# Patient Record
Sex: MALE | HISPANIC OR LATINO | Employment: FULL TIME | URBAN - METROPOLITAN AREA
[De-identification: names, ages, dates, MRNs, and addresses within clinical notes are randomized per-mention and may not be internally consistent; named-entity substitution may affect disease eponyms.]

---

## 2023-01-23 ENCOUNTER — APPOINTMENT (OUTPATIENT)
Dept: CT IMAGING | Facility: CLINIC | Age: 43
End: 2023-01-23
Attending: EMERGENCY MEDICINE
Payer: OTHER MISCELLANEOUS

## 2023-01-23 ENCOUNTER — APPOINTMENT (OUTPATIENT)
Dept: GENERAL RADIOLOGY | Facility: CLINIC | Age: 43
End: 2023-01-23
Attending: EMERGENCY MEDICINE
Payer: OTHER MISCELLANEOUS

## 2023-01-23 ENCOUNTER — APPOINTMENT (OUTPATIENT)
Dept: CT IMAGING | Facility: CLINIC | Age: 43
End: 2023-01-23
Attending: PHYSICIAN ASSISTANT
Payer: OTHER MISCELLANEOUS

## 2023-01-23 ENCOUNTER — HOSPITAL ENCOUNTER (EMERGENCY)
Facility: CLINIC | Age: 43
Discharge: HOME | End: 2023-01-23
Attending: PHYSICIAN ASSISTANT | Admitting: PHYSICIAN ASSISTANT
Payer: OTHER MISCELLANEOUS

## 2023-01-23 VITALS
DIASTOLIC BLOOD PRESSURE: 85 MMHG | TEMPERATURE: 98 F | WEIGHT: 150 LBS | HEART RATE: 70 BPM | HEIGHT: 61 IN | BODY MASS INDEX: 28.32 KG/M2 | SYSTOLIC BLOOD PRESSURE: 119 MMHG | RESPIRATION RATE: 18 BRPM | OXYGEN SATURATION: 99 %

## 2023-01-23 DIAGNOSIS — S09.90XA HEAD INJURY, INITIAL ENCOUNTER: ICD-10-CM

## 2023-01-23 DIAGNOSIS — M54.50 ACUTE MIDLINE LOW BACK PAIN, UNSPECIFIED WHETHER SCIATICA PRESENT: ICD-10-CM

## 2023-01-23 PROCEDURE — 72100 X-RAY EXAM L-S SPINE 2/3 VWS: CPT

## 2023-01-23 PROCEDURE — 250N000013 HC RX MED GY IP 250 OP 250 PS 637: Performed by: PHYSICIAN ASSISTANT

## 2023-01-23 PROCEDURE — 70450 CT HEAD/BRAIN W/O DYE: CPT

## 2023-01-23 PROCEDURE — 72131 CT LUMBAR SPINE W/O DYE: CPT

## 2023-01-23 PROCEDURE — 250N000013 HC RX MED GY IP 250 OP 250 PS 637: Performed by: EMERGENCY MEDICINE

## 2023-01-23 PROCEDURE — 99285 EMERGENCY DEPT VISIT HI MDM: CPT | Mod: 25

## 2023-01-23 RX ORDER — CYCLOBENZAPRINE HCL 5 MG
5 TABLET ORAL 3 TIMES DAILY PRN
Qty: 21 TABLET | Refills: 0 | Status: SHIPPED | OUTPATIENT
Start: 2023-01-23 | End: 2023-01-30

## 2023-01-23 RX ORDER — ACETAMINOPHEN 500 MG
1000 TABLET ORAL ONCE
Status: COMPLETED | OUTPATIENT
Start: 2023-01-23 | End: 2023-01-23

## 2023-01-23 RX ORDER — KETOROLAC TROMETHAMINE 15 MG/ML
15 INJECTION, SOLUTION INTRAMUSCULAR; INTRAVENOUS ONCE
Status: DISCONTINUED | OUTPATIENT
Start: 2023-01-23 | End: 2023-01-23

## 2023-01-23 RX ORDER — IBUPROFEN 800 MG/1
800 TABLET, FILM COATED ORAL ONCE
Status: COMPLETED | OUTPATIENT
Start: 2023-01-23 | End: 2023-01-23

## 2023-01-23 RX ORDER — METHYLPREDNISOLONE 4 MG
TABLET, DOSE PACK ORAL
Qty: 21 TABLET | Refills: 0 | Status: SHIPPED | OUTPATIENT
Start: 2023-01-23

## 2023-01-23 RX ADMIN — ACETAMINOPHEN 1000 MG: 500 TABLET ORAL at 20:05

## 2023-01-23 RX ADMIN — IBUPROFEN 800 MG: 800 TABLET, FILM COATED ORAL at 17:44

## 2023-01-23 ASSESSMENT — ACTIVITIES OF DAILY LIVING (ADL)
ADLS_ACUITY_SCORE: 35
ADLS_ACUITY_SCORE: 35

## 2023-01-23 ASSESSMENT — ENCOUNTER SYMPTOMS
NUMBNESS: 0
HEADACHES: 1
BACK PAIN: 1
WEAKNESS: 0

## 2023-01-23 NOTE — ED TRIAGE NOTES
Was at work two hours ago when a forklift fell onto pt's head and back and is now having back pain and headache. No LOC. [place] like he was going to pass out but did not. Ambulatory in triage. No daily medications or blood thinners. No allergies.

## 2023-01-23 NOTE — ED NOTES
Rapid Assessment Note    History:   Shan Lamb is a 42 year old male who presents with concern for posterior head injury and lower back injury that occurred with blunt trauma from a forklift at his workplace just prior to arrival earlier this afternoon.  No analgesics taken so far.  He is not on blood thinners.  His arms and legs feel fine.  No trouble breathing.  No abdominal pain.  The pain in his lower back just in the midline, not on the sides.  No vomiting.  [facility]    Exam:   General:  Alert, interactive  Cardiovascular:  Well perfused  Lungs:  No respiratory distress, no accessory muscle use  Neuro:  Moving all 4 extremities  Skin:  Warm, dry  Psych:  Normal affect    Plan of Care:   I evaluated the patient and developed an initial plan of care. I discussed this plan and explained that I, or one of my partners, would be returning to complete the evaluation.     Patient agrees with plan to pursue CT of the head and lumbar spine x-ray, ibuprofen ordered for analgesia in the meantime.      1/23/2023  [facility]       Nirav Terrell MD  01/23/23 8636

## 2023-01-23 NOTE — LETTER
Shan Lamb was seen and treated in our emergency department on 1/23/2023.  He may return to work on 01/26/2023.  No heavy lifting x 1 week     If you have any questions or concerns, please don't hesitate to call.      Silas Stinson, DANIEL

## 2023-01-24 ASSESSMENT — ENCOUNTER SYMPTOMS
VOMITING: 0
NAUSEA: 0
ABDOMINAL PAIN: 0
NECK STIFFNESS: 0
NECK PAIN: 0
SHORTNESS OF BREATH: 0

## 2023-01-24 NOTE — ED PROVIDER NOTES
"  History     Chief Complaint:  Head Injury and Back Pain      A  was used (Wallisian).      Shan Lamb is a 42 year old male who presents for evaluation of a head injury and back pain. Today around noon the patient was at work when a forklift fell against the patient striking him in the back of the head and the back. He did not lose consciousness from this injury. Since then he has developed a headache as well as back pain with intermittent radiation to his bilateral legs, prompting him to come into the ED with concern for his injuries. Upon evaluation in the ED he reports ongoing back pain that is worse with movement but denies ongoing pain radiating to his legs at this time. He has not had any saddle anesthesia, weakness, or incontinence of bowel or bladder. He is not anticoagulated.      Independent Historian:    None     Review of External Notes:     ROS:  Review of Systems   Eyes: Negative for visual disturbance.   Respiratory: Negative for shortness of breath.    Cardiovascular: Negative for chest pain.   Gastrointestinal: Negative for abdominal pain, nausea and vomiting.   Musculoskeletal: Positive for back pain. Negative for neck pain and neck stiffness.   Neurological: Positive for headaches. Negative for syncope, weakness and numbness.   All other systems reviewed and are negative.      Allergies:  No known drug allergies     Medications:    The patient is not currently taking any prescribed medications.     Past Medical History:    The patient denies any past medical history.     Social History:  The patient speaks Wallisian primarily.   The patient presents to the ED alone.       Physical Exam     Patient Vitals for the past 24 hrs:   BP Temp Temp src Pulse Resp SpO2 Height Weight   01/23/23 2132 119/85 -- -- 70 18 99 % -- --   01/23/23 1434 123/81 98  F (36.7  C) Oral 63 18 99 % 1.549 m (5' 1\") 68 kg (150 lb)        Physical Exam  Musculoskeletal:        Back:          Head " : no raccoon eyes or awad's sign.  Eyes: Nonicteric, noninjected, normal range of motion, PERRLA  Nose: Not congested, no rhinorrhea  Ears: Bilateral tympanic membranes are pearly gray without erythema, or bulging.  Canals are free of discharge.  TMs are intact. No hemotympanum.  Oropharynx: No erythema of the back of the throat, uvula midline, moist mucous membranes, no tonsillitis, no trismus.  Neck: No cervical midline tenderness.  No midline pain with full ROM.  Heart: Regular rate and rhythm without murmurs, rubs, gallops  Lungs: Bilateral breath sounds, Clear to auscultation, no wheezing, rhonchi, Rales, crackles.  Normal respiratory excursion.  Abdomen: Soft, nontender to palpation in all 4 quadrants without rebound or guarding.  Nondistended.   Skin: no bruising or wounds  Neuro: Alert and oriented x3, symmetrical facial features, speech normal, bilateral upper extremity strength 5-5 with , 5-5 bilateral lower extremity strength with flexion-extension of the hips, knees, ankles.  Romberg testing negative.  Sensation equal and normal grossly bilaterally.  No tongue deviation.  Back Lumbar midline tenderness without step offs or crepitus. No paraspinal pain. Limited ROM due to pain.  Upper extremities: No pain with palpation of bilateral shoulders, elbows, wrists with full ROM without pain.  No point tenderness to the humerus or forearms.  Lower extremities: Normal ROM of the hips, knees, ankles.  No tenderness or deformity of the femurs and tib-fib's.     Emergency Department Course     Imaging:  Lumbar spine CT w/o contrast   Final Result   IMPRESSION:   1.  Mild, age-indeterminate anterior wedging of the T12 and L1 vertebra. An MRI could determine acuity if clinically indicated.   2.  No high-grade spinal canal or neural foraminal stenosis.      XR Lumbar Spine 2/3 Views   Final Result   IMPRESSION: 5 lumbar segments. Mild anterior wedging T12 and L1, loss of height about 15% and 10% respectively,  acuity indeterminate. No high-grade compressions. No retropulsion. 2 mm retrolisthesis L5-S1 with L5-S1 mild interspace narrowing. Mild    leftward lumbar curve. Sacral neural foramina are intact. Degenerative changes inferior SI joints. Mild hypertrophic changes lower lumbar spine facet joints. Correlate to point of maximal tenderness if there is concern for low-grade compression T12-L1    levels. Otherwise no acute process.      CT Head w/o Contrast   Final Result   IMPRESSION:   1.  No acute process is identified intracranially.      2.  No fractures.      3.  No hyperdense hemorrhage intracranially. No significant swelling of the facial or scalp soft tissues.      Report per radiology    Emergency Department Course & Assessments:     Interventions:  1744 Ibuprofen 800 mg PO  2005 Tylenol 1,000 mg PO     Independent Interpretation (X-rays, CTs, rhythm strip):  Lumbar Xray,     Consultations/Discussion of Management or Tests:       Social Determinants of Health affecting care:  Non English speaker       Assessments:  1930: The patient was seen and evaluated.     2056: I updated and reassessed the patient.     Disposition:  The patient was discharged to home.     Impression & Plan    CMS Diagnoses: None    Medical Decision Making:  This is a 42-year-old male who was involved in an accident at work injuring his back and head.  Imaging of his head is negative for intracranial bleeding or fracture.  Patient's not on any blood thinners.  Imaging of his back did show some anterior wedging at T12 and L1 this was followed up by a noncontrast CT scan of his spine without any evidence of underlying fractures or severe spinal cord compromise.  Clinically he is not exhibiting any red flag symptoms or weakness that would be consistent with concerns regarding spinal cord compromise.  At this time I do not feel he requires any emergent MRI.  However due to the findings on CT scan and his back pain I will have him follow-up with  neurosurgery.  He should avoid heavy lifting at work and he can utilize over-the-counter anti-inflammatories heat and muscle relaxants.  Patient did report that pain improved with ibuprofen and Tylenol that was given to him today.  He should also monitor for signs of any worsening headache, vomiting, confusion or that would prompt him to return back concerning head injury.  We discussed brain rest and primary care follow-up as well which has also been referred for the patient.  If he develops any red flag symptoms for back pain or worsening condition regarding his head injury return back to the emergency department.     Diagnosis:    ICD-10-CM    1. Acute midline low back pain, unspecified whether sciatica present  M54.50 Primary Care Referral     Neurosurgery Referral     CANCELED: Neurosurgery Referral      2. Head injury, initial encounter  S09.90XA Primary Care Referral           Discharge Medications:  Discharge Medication List as of 1/23/2023  9:15 PM      START taking these medications    Details   cyclobenzaprine (FLEXERIL) 5 MG tablet Take 1 tablet (5 mg) by mouth 3 times daily as needed for muscle spasms, Disp-21 tablet, R-0, Local Print      methylPREDNISolone (MEDROL DOSEPAK) 4 MG tablet therapy pack Follow Package Directions, Disp-21 tablet, R-0, Local Print                Scribe Disclosure:  I, José Miguel Vidal, am serving as a scribe at 7:20 PM on 1/23/2023 to document services personally performed by Silas Stinson PA-C based on my observations and the provider's statements to me.   1/23/2023   Silas Stinson PA-C Kruger, Jacob C, PA-C  01/24/23 0931

## 2023-01-24 NOTE — DISCHARGE INSTRUCTIONS
Discharge Instructions  Back Pain  You were seen today for back pain. Back pain can have many causes, but most will get better without surgery or other specific treatment. Sometimes there is a herniated ( slipped ) disc. We do not usually do MRI scans to look for these right away, since most herniated discs will get better on their own with time.  Today, we did not find any evidence that your back pain was caused by a serious condition. However, sometimes symptoms develop over time and cannot be found during an emergency visit, so it is very important that you follow up with your primary provider.  Generally, every Emergency Department visit should have a follow-up clinic visit with either a primary or a specialty clinic/provider. Please follow-up as instructed by your emergency provider today.    Return to the Emergency Department if:  You develop a fever with your back pain.   You have weakness or change in sensation in one or both legs.  You lose control of your bowels or bladder, or cannot empty your bladder (cannot pee).  Your pain gets much worse.     Follow-up with your provider:  Unless your pain has completely gone away, please make an appointment with your provider within one week. Most of the routine care for back pain is available in a clinic and not the Emergency Department. You may need further management of your back pain, such as more pain medication, imaging such as an X-ray or MRI, or physical therapy.    What can I do to help myself?  Remain Active -- People are often afraid that they will hurt their back further or delay recovery by remaining active, but this is one of the best things you can do for your back. In fact, staying in bed for a long time to rest is not recommended. Studies have shown that people with low back pain recover faster when they remain active. Movement helps to bring blood flow to the muscles and relieve muscle spasms as well as preventing loss of muscle strength.  Heat --  Using a heating pad can help with low back pain during the first few weeks. Do not sleep with a heating pad, as you can be burned.   Pain medications - You may take a pain medication such as Tylenol  (acetaminophen), Advil , Motrin  (ibuprofen) or Aleve  (naproxen).  If you were given a prescription for medicine here today, be sure to read all of the information (including the package insert) that comes with your prescription.  This will include important information about the medicine, its side effects, and any warnings that you need to know about.  The pharmacist who fills the prescription can provide more information and answer questions you may have about the medicine.  If you have questions or concerns that the pharmacist cannot address, please call or return to the Emergency Department.   Remember that you can always come back to the Emergency Department if you are not able to see your regular provider in the amount of time listed above, if you get any new symptoms, or if there is anything that worries you.  Discharge Instructions  Head Injury    You have been seen today for a head injury. Your evaluation included a history and physical examination. You may have had a CT (CAT) scan performed, though most head injuries do not require a scan. Based on this evaluation, your provider today does not feel that your head injury is serious.    Generally, every Emergency Department visit should have a follow-up clinic visit with either a primary or a specialty clinic/provider. Please follow-up as instructed by your emergency provider today.  Return to the Emergency Department if:  You are confused or you are not acting right.  Your headache gets worse or you start to have a really bad headache even with your recommended treatment plan.  You vomit (throw up) more than once.  You have a seizure.  You have trouble walking.  You have weakness or paralysis (cannot move) in an arm or a leg.  You have blood or fluid coming  from your ears or nose.  You have new symptoms or anything that worries you.    Sleeping:  It is okay for you to sleep, but someone should wake you up if instructed by your provider, and someone should check on you at your usual time to wake up.     Activity:  Do not drive for at least 24 hours.  Do not drive if you have dizzy spells or trouble concentrating, or remembering things.  Do not return to any contact sports until cleared by your regular provider.     MORE INFORMATION:    Concussion:  A concussion is a minor head injury that may cause temporary problems with the way the brain works. Although concussions are important, they are generally not an emergency or a reason that a person needs to be hospitalized. Some concussion symptoms include confusion, amnesia (forgetful), nausea (sick to your stomach) and vomiting (throwing up), dizziness, fatigue, memory or concentration problems, irritability and sleep problems. For most people, concussions are mild and temporary but some will have more severe and persistent symptoms that require on-going care and treatment.  CT Scans: Your evaluation today may have included a CT scan (CAT scan) to look for things like bleeding or a skull fracture (broken bone).  CT scans involve radiation and too many CT scans can cause serious health problems like cancer, especially in children.  Because of this, your provider may not have ordered a CT scan today if they think you are at low risk for a serious or life threatening problem.    If you were given a prescription for medicine here today, be sure to read all of the information (including the package insert) that comes with your prescription.  This will include important information about the medicine, its side effects, and any warnings that you need to know about.  The pharmacist who fills the prescription can provide more information and answer questions you may have about the medicine.  If you have questions or concerns that  the pharmacist cannot address, please call or return to the Emergency Department.     Remember that you can always come back to the Emergency Department if you are not able to see your regular provider in the amount of time listed above, if you get any new symptoms, or if there is anything that worries you.

## 2025-01-10 ENCOUNTER — HOSPITAL ENCOUNTER (EMERGENCY)
Facility: CLINIC | Age: 45
Discharge: HOME | End: 2025-01-10
Payer: COMMERCIAL

## 2025-01-10 VITALS
SYSTOLIC BLOOD PRESSURE: 131 MMHG | BODY MASS INDEX: 26.53 KG/M2 | HEART RATE: 79 BPM | TEMPERATURE: 97.2 F | RESPIRATION RATE: 14 BRPM | OXYGEN SATURATION: 100 % | WEIGHT: 155.42 LBS | DIASTOLIC BLOOD PRESSURE: 80 MMHG | HEIGHT: 64 IN

## 2025-01-10 DIAGNOSIS — K08.89 PAIN, DENTAL: ICD-10-CM

## 2025-01-10 DIAGNOSIS — R22.0 FACIAL SWELLING: ICD-10-CM

## 2025-01-10 PROCEDURE — 250N000013 HC RX MED GY IP 250 OP 250 PS 637

## 2025-01-10 PROCEDURE — 99284 EMERGENCY DEPT VISIT MOD MDM: CPT | Mod: 25

## 2025-01-10 PROCEDURE — 40800 DRAINAGE OF MOUTH LESION: CPT

## 2025-01-10 PROCEDURE — 250N000009 HC RX 250

## 2025-01-10 RX ORDER — CHLORHEXIDINE GLUCONATE ORAL RINSE 1.2 MG/ML
15 SOLUTION DENTAL 2 TIMES DAILY
Qty: 473 ML | Refills: 0 | Status: CANCELLED | OUTPATIENT
Start: 2025-01-10

## 2025-01-10 RX ORDER — IBUPROFEN 600 MG/1
600 TABLET, FILM COATED ORAL EVERY 6 HOURS PRN
Qty: 60 TABLET | Refills: 0 | Status: SHIPPED | OUTPATIENT
Start: 2025-01-10 | End: 2025-01-10

## 2025-01-10 RX ORDER — IBUPROFEN 600 MG/1
600 TABLET, FILM COATED ORAL EVERY 6 HOURS PRN
Qty: 60 TABLET | Refills: 0 | Status: CANCELLED | OUTPATIENT
Start: 2025-01-10

## 2025-01-10 RX ORDER — CHLORHEXIDINE GLUCONATE ORAL RINSE 1.2 MG/ML
15 SOLUTION DENTAL 2 TIMES DAILY
Qty: 473 ML | Refills: 0 | Status: SHIPPED | OUTPATIENT
Start: 2025-01-10

## 2025-01-10 RX ORDER — IBUPROFEN 600 MG/1
600 TABLET, FILM COATED ORAL EVERY 6 HOURS PRN
Qty: 60 TABLET | Refills: 0 | Status: SHIPPED | OUTPATIENT
Start: 2025-01-10

## 2025-01-10 RX ORDER — CHLORHEXIDINE GLUCONATE ORAL RINSE 1.2 MG/ML
15 SOLUTION DENTAL 2 TIMES DAILY
Qty: 473 ML | Refills: 0 | Status: SHIPPED | OUTPATIENT
Start: 2025-01-10 | End: 2025-01-10

## 2025-01-10 RX ADMIN — AMOXICILLIN AND CLAVULANATE POTASSIUM 1 TABLET: 875; 125 TABLET, FILM COATED ORAL at 16:17

## 2025-01-10 RX ADMIN — TOPICAL ANESTHETIC 1 ML: 200 SPRAY DENTAL; PERIODONTAL at 15:57

## 2025-01-10 ASSESSMENT — ACTIVITIES OF DAILY LIVING (ADL)
ADLS_ACUITY_SCORE: 41

## 2025-01-10 ASSESSMENT — COLUMBIA-SUICIDE SEVERITY RATING SCALE - C-SSRS
2. HAVE YOU ACTUALLY HAD ANY THOUGHTS OF KILLING YOURSELF IN THE PAST MONTH?: NO
6. HAVE YOU EVER DONE ANYTHING, STARTED TO DO ANYTHING, OR PREPARED TO DO ANYTHING TO END YOUR LIFE?: NO
1. IN THE PAST MONTH, HAVE YOU WISHED YOU WERE DEAD OR WISHED YOU COULD GO TO SLEEP AND NOT WAKE UP?: NO

## 2025-01-10 NOTE — DISCHARGE INSTRUCTIONS
Augmentin twice daily for 10 days    Chloraseptic rinse twice daily until improved    Ibuprofen 600 mg every 6 hours for swelling and pain    Follow up with dentist    Return for worsening, fevers    Discharge Instructions  Dental Pain    You have been seen today for a toothache. Your pain may be caused by an exposed nerve, an infection (pulpitis), a root abscess (pocket of pus), or other problems. You will need to see a dentist for a solution to your tooth problem. Emergency Department care is only to help control your problem until you can see a dentist; we cannot provide complete dental care.  Today, we did not find any sign that your toothache was caused by any dangerous or life-threatening condition, but sometimes symptoms develop over time and cannot be found during an emergency visit, so it is very important that you follow up with your dentist.      Generally, every Emergency Department visit should have a follow-up clinic visit with either a primary or a specialty clinic/provider. Please follow-up as instructed by your emergency provider today.    Return to the Emergency Department if:  You develop a new fever over 100.4 F.  You cannot open your mouth normally, cannot move your tongue well, or cannot swallow.  You have new or increased swelling of your face or neck.  You develop drainage of pus or foul smelling material from around your tooth.  What can I do to help myself?  Take any antibiotic the provider may have prescribed for you today.  Avoid very hot or very cold foods as both can cause pain.  Make an appointment to see a dentist as soon as possible. Dentists are generally not  on-staff  at hospitals so we cannot  refer  to you to dentist but we may be able to provide a list of dental clinics to help you.  If you were given a prescription for medicine here today, be sure to read all of the information (including the package insert) that comes with your prescription.  This will include important  information about the medicine, its side effects, and any warnings that you need to know about.  The pharmacist who fills the prescription can provide more information and answer questions you may have about the medicine.  If you have questions or concerns that the pharmacist cannot address, please call or return to the Emergency Department.   Remember that you can always come back to the Emergency Department if you are not able to see your regular provider in the amount of time listed above, if you get any new symptoms, or if there is anything that worries you.

## 2025-01-10 NOTE — Clinical Note
Shan Lamb was seen and treated in our emergency department on 1/10/2025.  He may return to work on 01/13/2025.       If you have any questions or concerns, please don't hesitate to call.      Callie Turner PA-C

## 2025-01-11 NOTE — ED PROVIDER NOTES
"Emergency Department Note      History of Present Illness   Chief Complaint  Dental Pain    HPI  Shan Lamb is a 44 year old male presenting today for evaluation of right facial swelling and dental pain with associated drainage from above the tooth.  He has been able to eat and drink.  No fevers.  No swelling of the cheeks.     History obtained using .    Independent Historian  None    Review of External Notes  None    Past Medical History   Medical History and Problem List  No past medical history on file.    Medications  amoxicillin-clavulanate (AUGMENTIN) 875-125 MG tablet  chlorhexidine (PERIDEX) 0.12 % solution  ibuprofen (ADVIL/MOTRIN) 600 MG tablet  methylPREDNISolone (MEDROL DOSEPAK) 4 MG tablet therapy pack        Surgical History   No past surgical history on file.  Physical Exam   Patient Vitals for the past 24 hrs:   BP Temp Pulse Resp SpO2 Height Weight   01/10/25 1615 131/80 -- 79 -- 100 % -- --   01/10/25 1117 145/88 97.2  F (36.2  C) 72 14 100 % 1.62 m (5' 3.78\") 70.5 kg (155 lb 6.8 oz)     Physical Exam  /80   Pulse 79   Temp 97.2  F (36.2  C)   Resp 14   Ht 1.62 m (5' 3.78\")   Wt 70.5 kg (155 lb 6.8 oz)   SpO2 100%   BMI 26.86 kg/m     General: Pleasant male. Examined in room 41.  Head: Atraumatic.   EENT: Moist mucus membranes.  Tolerating secretions.  Floor of the mouth is soft.  Dentition fairly good.  There is no periapical abscess, indicates tooth #8 as the primary area of pain.  Area of induration extending from the vermilion border of the right lip to the nasolabial fold.  CV: Regular rate.  Respiratory: Breathing comfortably on room air.  Normal work of breathing.  Msk: No obvious deformity.   Skin: Warm and dry. No rashes.  Neuro: Awake, alert, and conversant.  Psych: Appropriate mood and affect.    ED Course    Medications Administered  Medications   benzocaine 20% (HURRICAINE/TOPEX) 20 % spray 1 mL (1 mL Mouth/Throat $Given by Other 1/10/25 " 1557)   amoxicillin-clavulanate (AUGMENTIN) 875-125 MG per tablet 1 tablet (1 tablet Oral $Given 1/10/25 8719)       Procedures  Procedures     Incision and Drainage     Procedure: Incision and Drainage     Consent: Verbal    Indication: Abscess    Location: Dental    Size: Unknown cm    Ultrasound Guidance: No    Preparation: None     Anesthesia/Sedation: Hurricaine spray     Procedure Detail:    Aspiration: Yes  Incision Type: None    Patient Status: The patient tolerated the procedure well: Yes. There were no complications.  Unfortunately, no fluid was removed, unclear if more of a cellulitic change versus a difficult to reach abscess.      Discussion of Management  None    Social Determinants of Health adding to complexity of care  None    ED Course     Medical Decision Making / Diagnosis   CMS Diagnoses: None    MIPS  None    MDM  The patient presents with a tooth ache and swelling above the painful tooth.  There is no abscess detected around the tooth amenable to incision and drainage, but with the indurated area above the tooth, I did attempt to aspirate fluid, but was unsuccessful.  He does have significant tenderness with palpation of this area and ultimately, he will need to see a dentist.  The differential diagnosis includes: cracked tooth syndrome, pulpitis, sub-apical abscess, amongst others.  There is no evidence of buccinator/canine space infections, significant facial swelling, or Rohith's angina. There are no posterior pharyngeal space infections detected.  Follow up with a dentist/endodontist in the coming days is indicated for further work up and treatment.  He was given a list of local low income dentists.  He is also started on Augmentin and we discussed signs and symptoms that should prompt immediate return.  He will also use chlorhexidine rinse in the meantime.    Disposition  The patient was discharged.     ICD-10 Codes:    ICD-10-CM    1. Pain, dental  K08.89       2. Facial swelling  R22.0             Discharge Medications  Discharge Medication List as of 1/10/2025  4:21 PM        START taking these medications    Details   amoxicillin-clavulanate (AUGMENTIN) 875-125 MG tablet Take 1 tablet by mouth 2 times daily for 7 days., Disp-14 tablet, R-0, E-Prescribe      chlorhexidine (PERIDEX) 0.12 % solution Swish and spit 15 mLs in mouth 2 times daily., Disp-473 mL, R-0, E-Prescribe      ibuprofen (ADVIL/MOTRIN) 600 MG tablet Take 1 tablet (600 mg) by mouth every 6 hours as needed for moderate pain., Disp-60 tablet, R-0, E-Prescribe               Callie Turner PA-C  January 10, 2025   [employer]        Callie Turner PA-C  01/10/25 6652